# Patient Record
Sex: MALE | Race: WHITE | NOT HISPANIC OR LATINO | Employment: UNEMPLOYED | URBAN - METROPOLITAN AREA
[De-identification: names, ages, dates, MRNs, and addresses within clinical notes are randomized per-mention and may not be internally consistent; named-entity substitution may affect disease eponyms.]

---

## 2019-05-08 ENCOUNTER — OFFICE VISIT (OUTPATIENT)
Dept: NEUROSURGERY | Facility: CLINIC | Age: 54
End: 2019-05-08

## 2019-05-08 ENCOUNTER — DOCUMENTATION (OUTPATIENT)
Dept: NEUROSURGERY | Facility: CLINIC | Age: 54
End: 2019-05-08

## 2019-05-08 VITALS
SYSTOLIC BLOOD PRESSURE: 155 MMHG | WEIGHT: 226.8 LBS | TEMPERATURE: 97.4 F | HEIGHT: 78 IN | DIASTOLIC BLOOD PRESSURE: 100 MMHG | BODY MASS INDEX: 26.24 KG/M2

## 2019-05-08 DIAGNOSIS — M79.2 NEUROPATHIC PAIN: Primary | ICD-10-CM

## 2019-05-08 DIAGNOSIS — E13.649 UNCONTROLLED OTHER SPECIFIED DIABETES MELLITUS WITH HYPOGLYCEMIA WITHOUT COMA (HCC): ICD-10-CM

## 2019-05-08 PROCEDURE — 99244 OFF/OP CNSLTJ NEW/EST MOD 40: CPT | Performed by: NEUROLOGICAL SURGERY

## 2019-05-08 RX ORDER — ATORVASTATIN CALCIUM 20 MG/1
TABLET, FILM COATED ORAL
COMMUNITY
Start: 2019-05-06

## 2019-05-08 RX ORDER — GABAPENTIN 300 MG/1
300 CAPSULE ORAL 3 TIMES DAILY
Qty: 90 CAPSULE | Refills: 5 | OUTPATIENT
Start: 2019-05-08 | End: 2019-10-22

## 2019-05-08 RX ORDER — GABAPENTIN 100 MG/1
CAPSULE ORAL
COMMUNITY
Start: 2019-05-06 | End: 2019-05-08

## 2019-05-08 NOTE — PROGRESS NOTES
Subjective     Chief Complaint: Left-sided abdominal wall pain    Patient ID: Ravin Richey is a 53 y.o. male seen for consultation today at the request of  RACHEL Saul    Back Pain   This is a chronic problem. The current episode started more than 1 year ago. The problem occurs intermittently. The problem is unchanged. The pain is present in the lumbar spine. The quality of the pain is described as aching. Associated symptoms include numbness and weakness. Pertinent negatives include no abdominal pain, chest pain, dysuria, fever or headaches.   Abdominal Pain   This is a new problem. The current episode started more than 1 month ago. The onset quality is sudden. The problem occurs constantly. The most recent episode lasted 8 weeks. The problem has been unchanged. The pain is located in the LLQ and LUQ. The pain is at a severity of 10/10. The pain is severe. The quality of the pain is burning (Numb). The abdominal pain radiates to the RUQ. Associated symptoms include arthralgias. Pertinent negatives include no constipation, diarrhea, dysuria, fever, frequency, headaches, hematuria, myalgias, nausea or vomiting. Exacerbated by: Skin contact. The pain is relieved by nothing. Treatments tried: Low-dose Neurontin. The treatment provided no relief. Prior workup: MRI lumbar and thoracic, KUB.       This is a 53-year-old man who presents to my clinic with chief complaints of left-sided upper and lower quadrant abdominal pain.  He describes the pain is simultaneously being numb and intensely burning.  He denies antecedent trauma.  He has had long-standing history of chronic low back pain and intermittent sciatica.    His medical comorbidities include tobacco abuse, and poorly controlled diabetes.  He had previously been on insulin, but lost access to a healthcare provider and has not been having any sort of diabetes treatment for the last 2 years.  He was recently started on metformin and an anticholesterol  agent.  He was also started on low-dose gabapentin for his abdominal wall discomfort.  He was initially thought to have some sort of impaction syndrome, however he has had multiple enemas and it does not appear to be constipated clinically.    The following portions of the patient's history were reviewed and updated as appropriate: allergies, current medications, past family history, past medical history, past social history, past surgical history and problem list.    Family history:   Family History   Problem Relation Age of Onset   • Hypertension Mother    • Heart disease Father    • Lupus Sister        Social history:   Social History     Socioeconomic History   • Marital status: Single     Spouse name: Not on file   • Number of children: Not on file   • Years of education: Not on file   • Highest education level: Not on file   Tobacco Use   • Smoking status: Current Every Day Smoker     Types: Cigars   • Smokeless tobacco: Never Used   Substance and Sexual Activity   • Alcohol use: Yes   • Drug use: No   • Sexual activity: Defer       Review of Systems   Constitutional: Positive for activity change, fatigue and unexpected weight change. Negative for appetite change, chills, diaphoresis and fever.   HENT: Positive for tinnitus. Negative for congestion, dental problem, drooling, ear discharge, ear pain, facial swelling, hearing loss, mouth sores, nosebleeds, postnasal drip, rhinorrhea, sinus pressure, sneezing, sore throat, trouble swallowing and voice change.    Eyes: Negative for photophobia, pain, discharge, redness, itching and visual disturbance.   Respiratory: Negative for apnea, cough, choking, chest tightness, shortness of breath, wheezing and stridor.    Cardiovascular: Negative for chest pain, palpitations and leg swelling.   Gastrointestinal: Negative for abdominal distention, abdominal pain, anal bleeding, blood in stool, constipation, diarrhea, nausea, rectal pain and vomiting.   Endocrine: Negative  "for cold intolerance, heat intolerance, polydipsia, polyphagia and polyuria.   Genitourinary: Positive for flank pain. Negative for decreased urine volume, difficulty urinating, dysuria, enuresis, frequency, genital sores, hematuria and urgency.   Musculoskeletal: Positive for arthralgias and back pain. Negative for gait problem, joint swelling, myalgias, neck pain and neck stiffness.   Skin: Negative for color change, pallor, rash and wound.   Allergic/Immunologic: Negative for environmental allergies, food allergies and immunocompromised state.   Neurological: Positive for weakness and numbness. Negative for dizziness, tremors, seizures, syncope, facial asymmetry, speech difficulty, light-headedness and headaches.   Hematological: Negative for adenopathy. Does not bruise/bleed easily.   Psychiatric/Behavioral: Positive for agitation and sleep disturbance. Negative for behavioral problems, confusion, decreased concentration, dysphoric mood, hallucinations, self-injury and suicidal ideas. The patient is not nervous/anxious and is not hyperactive.        Objective   Blood pressure 155/100, temperature 97.4 °F (36.3 °C), temperature source Temporal, height 200.7 cm (79\"), weight 103 kg (226 lb 12.8 oz).  Body mass index is 25.55 kg/m².    Physical Exam   Constitutional: He is oriented to person, place, and time. He appears well-developed.  Non-toxic appearance.   HENT:   Head: Normocephalic and atraumatic.   Right Ear: Hearing normal.   Left Ear: Hearing normal.   Nose: Nose normal.   Eyes: Conjunctivae, EOM and lids are normal. Pupils are equal, round, and reactive to light.   Neck: Normal range of motion. No JVD present.   Cardiovascular: Normal rate and regular rhythm.   Pulses:       Radial pulses are 2+ on the right side, and 2+ on the left side.   Pulmonary/Chest: Effort normal. No stridor. No respiratory distress. He has no wheezes.   Abdominal:       Musculoskeletal:        Right hip: He exhibits decreased " range of motion.        Left hip: He exhibits decreased range of motion and tenderness.        Thoracic back: He exhibits no tenderness, no swelling, no pain and no spasm.        Lumbar back: He exhibits decreased range of motion and tenderness. He exhibits no bony tenderness, no swelling, no pain and no spasm.   Muscle Group    L          R  Hip Flexor          5          5  Knee Extensor  5          5  ADF                   5          5  APF                   5          5  EHL                   5          5   Neurological: He is alert and oriented to person, place, and time. He has normal strength and normal reflexes. He displays normal reflexes. No cranial nerve deficit or sensory deficit. He exhibits normal muscle tone. He displays a negative Romberg sign. GCS eye subscore is 4. GCS verbal subscore is 5. GCS motor subscore is 6.   Reflex Scores:       Tricep reflexes are 2+ on the right side and 2+ on the left side.       Bicep reflexes are 2+ on the right side and 2+ on the left side.       Brachioradialis reflexes are 2+ on the right side and 2+ on the left side.       Patellar reflexes are 2+ on the right side and 2+ on the left side.       Achilles reflexes are 2+ on the right side and 2+ on the left side.  Skin: Skin is warm and dry. No rash noted. No erythema.   Psychiatric: He has a normal mood and affect. His behavior is normal. Judgment and thought content normal.   Nursing note and vitals reviewed.        Assessment/Plan     Independent Review of Radiographic Studies:      Available for my review is a MRI of the thoracic and lumbar spine.  The thoracic MRI is completely unremarkable.  There is no evidence of disc herniation or extruded fragment which might be contributing to a thoracic radiculopathy.  His lumbar spine demonstrates a scoliotic deformity with severe degenerative disc disease, most notably at L4-5 and L5-S1.  L5-S1 does not demonstrate any significant neuroforaminal stenosis, however  there is bilateral lateral recess stenosis which is slightly worse on the left side as compared to the right, and could be reasonably expected to contributing to a left-sided S1 radiculopathy.  There is a grade 1 spondylolisthesis of L5 on S1.  There is moderate lateral recess stenosis at L4-5 which may be contributing to a right-sided L5 radiculopathy.  There are no appreciable disc herniations which could be contributing to his left-sided chest wall pain.  There is a far lateral disc at L1-2 which is eccentric to the right side and may be compressing the exiting L1 nerve root.    Medical Decision Making:      This is a 53-year-old man with poorly controlled diabetes, tobacco abuse, and a approximately 8-week history of neuropathic pain of his left abdominal wall.    I would like to perform an EMG/nerve conduction test to assess for any sort of neuropathy.  I suspect that he probably has a cutaneous nerve entrapment syndrome which should be amenable to physical therapy.    I have also increased his dose of Neurontin.  He does not have any external sequelae to support a diagnosis of zoster at this point.    I do not think is a candidate for neurosurgical intervention.  I will call him with the results of his EMG/nerve conduction test and make the appropriate referral to either neurology or physical therapy.    Ravin was seen today for back pain and abdominal pain.    Diagnoses and all orders for this visit:    Neuropathic pain  -     EMG & Nerve Conduction Test  -     gabapentin (NEURONTIN) 300 MG capsule; Take 1 capsule by mouth 3 (Three) Times a Day.    Uncontrolled other specified diabetes mellitus with hypoglycemia without coma (CMS/HCC)  -     EMG & Nerve Conduction Test  -     gabapentin (NEURONTIN) 300 MG capsule; Take 1 capsule by mouth 3 (Three) Times a Day.        Return if symptoms worsen or fail to improve.           This document signed by BENNY Arreola MD May 8, 2019 2:07 PM

## 2019-05-09 ENCOUNTER — HOSPITAL ENCOUNTER (OUTPATIENT)
Dept: NEUROLOGY | Facility: HOSPITAL | Age: 54
Discharge: HOME | End: 2019-05-09
Admitting: NEUROLOGICAL SURGERY

## 2019-05-09 PROCEDURE — 95913 NRV CNDJ TEST 13/> STUDIES: CPT

## 2019-05-09 PROCEDURE — 95886 MUSC TEST DONE W/N TEST COMP: CPT

## 2019-10-22 ENCOUNTER — OFFICE VISIT (OUTPATIENT)
Dept: NEUROLOGY | Facility: CLINIC | Age: 54
End: 2019-10-22

## 2019-10-22 VITALS
HEIGHT: 78 IN | BODY MASS INDEX: 26.15 KG/M2 | SYSTOLIC BLOOD PRESSURE: 158 MMHG | DIASTOLIC BLOOD PRESSURE: 78 MMHG | WEIGHT: 226 LBS

## 2019-10-22 DIAGNOSIS — M54.14 THORACIC RADICULOPATHY DUE TO DIABETES MELLITUS (HCC): Primary | ICD-10-CM

## 2019-10-22 DIAGNOSIS — E11.69 THORACIC RADICULOPATHY DUE TO DIABETES MELLITUS (HCC): Primary | ICD-10-CM

## 2019-10-22 PROCEDURE — 99204 OFFICE O/P NEW MOD 45 MIN: CPT | Performed by: PSYCHIATRY & NEUROLOGY

## 2019-10-22 RX ORDER — GABAPENTIN 300 MG/1
300 CAPSULE ORAL 3 TIMES DAILY
Qty: 90 CAPSULE | Refills: 1 | Status: SHIPPED | OUTPATIENT
Start: 2019-10-22 | End: 2019-11-18

## 2019-10-22 RX ORDER — AMITRIPTYLINE HYDROCHLORIDE 25 MG/1
50 TABLET, FILM COATED ORAL NIGHTLY
Qty: 60 TABLET | Refills: 0 | Status: SHIPPED | OUTPATIENT
Start: 2019-10-22 | End: 2019-11-18 | Stop reason: SINTOL

## 2019-10-22 RX ORDER — GABAPENTIN 600 MG/1
600 TABLET ORAL 3 TIMES DAILY
Qty: 90 TABLET | Refills: 1 | Status: SHIPPED | OUTPATIENT
Start: 2019-10-22 | End: 2019-11-18

## 2019-10-22 NOTE — PROGRESS NOTES
Subjective:    CC: Ravin Richey is seen today in consultation at the request of RACHEL Saul for Numbness       HPI:  Patient is a 54-year-old male with past medical history of uncontrolled type 2 diabetes referred to the clinic for evaluation of tingling, numbness, burning.  He reports that he was doing fine until March 1, 2019 when he suddenly started having tingling, numbness, burning and severe pain below the bellybutton to the left and then a few days later, it radiated to involve entire left middle and upper quadrant of the abdomen extending below the nipple and sometimes above the nipple on the left.  Eventually this radiated to the right upper and middle abdominal quadrant going down.  Essentially he has developed rectangular area of severe pain, numbness, burning and sensitivity to touch in T4-T10 dermatome.  He was started on gabapentin 600 mg 3 times daily and reports that it has helped with the pain but it has not helped with numbness, burning and sensitivity to touch.  He denies any side effects with gabapentin use.  He has had MRI of the thoracic spine which did not reveal any structural abnormalities.  He denies any neck pain or midthoracic pain.  He does report chronic low back pain and MRI of lumbosacral spine done showed moderate to severe spondylitic changes at L5-S1 levels.  He has had poor control of his diabetes and HbA1c few months ago was 13.2.  Last HbA1c was 8.2.  He is working hard and making sure he takes his medications regularly and taking good care of diet to bring blood sugar levels under good control.  Because of his ongoing symptoms, he is unable to work.    The following portions of the patient's history were reviewed today and updated as of 10/22/2019  : allergies, social history and problem list.  This document will be scanned to patient's chart.      Current Outpatient Medications:   •  metFORMIN (GLUCOPHAGE) 500 MG tablet, , Disp: , Rfl:   •  amitriptyline  "(ELAVIL) 25 MG tablet, Take 2 tablets by mouth Every Night., Disp: 60 tablet, Rfl: 0  •  atorvastatin (LIPITOR) 20 MG tablet, , Disp: , Rfl:   •  gabapentin (NEURONTIN) 300 MG capsule, Take 1 capsule by mouth 3 (Three) Times a Day., Disp: 90 capsule, Rfl: 1  •  gabapentin (NEURONTIN) 600 MG tablet, Take 1 tablet by mouth 3 (Three) Times a Day., Disp: 90 tablet, Rfl: 1   Past Medical History:   Diagnosis Date   • Diabetes mellitus (CMS/HCC)    • Low back pain       Past Surgical History:   Procedure Laterality Date   • ROTATOR CUFF REPAIR        Family History   Problem Relation Age of Onset   • Hypertension Mother    • Heart disease Father    • Lupus Sister       Review of Systems    All other systems reviewed and are negative     Objective:    /78   Ht 200.7 cm (79\")   Wt 103 kg (226 lb)   BMI 25.46 kg/m²     Neurology Exam:    General apperance: NAD.     Mental status: Alert, awake and oriented to time place and person.    Recent and Remote memory: Can recall 3/3 objects at 5 minutes. Can recall historical events.     Attention span and Concentration: Serial 7s: Normal.     Fund of knowledge:  Normal.     Language and Speech: No aphasia or dysarthria.    Naming , Repitition and Comprehension:  Can name objects, repeat a sentence and follow commands. Speech is clear and fluent with good repetition, comprehension, and naming.    Cranial Nerves:   CN II: Visual fields are full. Intact. Fundi - Normal, No papillederma, Pupils - CHARMAINE  CN III, IV and VI: Extraocular movements are intact. Normal saccades.   CN V: Facial sensation is intact.   CN VII: Muscles of facial expression reveal no asymmetry. Intact.   CN VIII: Hearing is intact. Whispered voice intact.   CN IX and X: Palate elevates symmetrically. Intact  CN XI: Shoulder shrug is intact.   CN XII: Tongue is midline without evidence of atrophy or fasciculation.     Motor:  Right UE muscle strength 5/5. Normal tone.     Left UE muscle strength 5/5. Normal " tone.      Right LE muscle strength5/5. Normal tone.     Left LE muscle strength 5/5. Normal tone.      Sensory: Sensitivity to touch in a rectangular area involving the abdominal wall.    DTRs: 2+ bilaterally in upper and lower extremities.    Babinski: Negative bilaterally.    Co-ordination: Normal finger-to-nose, heel to shin B/L.    Rhomberg: Negative.    Gait: Normal.    Cardiovascular: Regular rate and rhythm without murmur, gallop or rub.    Ophthalmoscopic exam: Normal fundi, no papilledema.    Assessment and Plan:  1. Thoracic radiculopathy due to diabetes mellitus  Patient with sudden onset of tingling, numbness, burning and pain involving T4-T10 dermatomal area in a rectangular since March 2019.  Diabetic thoracic radiculopathy in a patient with normal MRI of thoracic spine.  With gabapentin 600 mg 3 times daily dose, the pain is better but he continues to have significant sensitivity to touch, burning and numbness.  Since he does not have any side effects with gabapentin, it will be increased to 900 mg 3 times daily dose in next 2 to 3 weeks.  Will also add amitriptyline 25 mg at bedtime to take relief.  I have advised him to call office with response in 2 weeks and at that time based on the response, further dose adjustments will be made to his medications.  I will see him back in 4 to 6 weeks for follow-up.    No Follow-up on file.     Aryan Seymour MD

## 2019-11-07 ENCOUNTER — TELEPHONE (OUTPATIENT)
Dept: NEUROLOGY | Facility: CLINIC | Age: 54
End: 2019-11-07

## 2019-11-07 NOTE — TELEPHONE ENCOUNTER
----- Message from Kervin Hernandez sent at 11/7/2019  1:40 PM EST -----  Contact: [phone]  Dr. Seymour,    Pt called in regards to an update on his Rx gabapentin and amitriptyline (ELAVIL) 25 MG tablet. Pt states the Gabapentin is doing good so far as stopping the pain for a while, but pain does come back. Pt states the Amitriptyline helps him sleep, but has side effects that he does not like. Pt states he is having trouble urinating, could not breathe last night after his right side started hurting, it makes his eyes hurt, he's dehydrated and he just feels a total mess. Pt states when he takes a deep breath he feels pressure on his lungs. Please advise.

## 2019-11-07 NOTE — TELEPHONE ENCOUNTER
Tell him to stop amitriptyline.  Does he have any side effects with gabapentin 900 mg 3 times a day dose?  If not then it can be increased further.  Thanks.

## 2019-11-08 NOTE — TELEPHONE ENCOUNTER
Relayed information patient confirmed understanding. He said he would give the increase a try and call back in 7-10 days.

## 2019-11-18 ENCOUNTER — OFFICE VISIT (OUTPATIENT)
Dept: NEUROLOGY | Facility: CLINIC | Age: 54
End: 2019-11-18

## 2019-11-18 VITALS
HEIGHT: 78 IN | DIASTOLIC BLOOD PRESSURE: 60 MMHG | BODY MASS INDEX: 26.15 KG/M2 | SYSTOLIC BLOOD PRESSURE: 124 MMHG | WEIGHT: 226 LBS

## 2019-11-18 DIAGNOSIS — E11.69 THORACIC RADICULOPATHY DUE TO DIABETES MELLITUS (HCC): Primary | ICD-10-CM

## 2019-11-18 DIAGNOSIS — M54.14 THORACIC RADICULOPATHY DUE TO DIABETES MELLITUS (HCC): Primary | ICD-10-CM

## 2019-11-18 PROCEDURE — 99214 OFFICE O/P EST MOD 30 MIN: CPT | Performed by: PSYCHIATRY & NEUROLOGY

## 2019-11-18 RX ORDER — NORTRIPTYLINE HYDROCHLORIDE 50 MG/1
50 CAPSULE ORAL NIGHTLY
Qty: 30 CAPSULE | Refills: 1 | Status: SHIPPED | OUTPATIENT
Start: 2019-11-18

## 2019-11-18 NOTE — PROGRESS NOTES
Subjective:    CC: Ravin Richey is in clinic today for follow up for thoracic radiculopathy.    HPI:  He is in clinic for regular follow-up.  Since the last visit, he reports that he has been taking gabapentin 900 mg tablet, 3 tablets in the morning as when he took it 3 times a day, it was not as effective.  He reports that with 2700 mg total daily dose of gabapentin, symptoms are little improved but he still of pain, sensitivity to touch etc.  He reports that he has been taking amitriptyline 50 mg at bedtime and it has helped significantly with sleep but he has developed significant side effects including urinary retention, extreme dryness of eyes and mouth.  He wants to come off of amitriptyline.    The following portions of the patient's history were reviewed and updated as of 11/18/2019: allergies, social history and problem list.       Current Outpatient Medications:   •  atorvastatin (LIPITOR) 20 MG tablet, , Disp: , Rfl:   •  metFORMIN (GLUCOPHAGE) 500 MG tablet, , Disp: , Rfl:   •  gabapentin, once-daily, (GRALISE) 600 MG tablet tablet, Take 4 in the morning., Disp: 120 tablet, Rfl: 1  •  nortriptyline (PAMELOR) 50 MG capsule, Take 1 capsule by mouth Every Night., Disp: 30 capsule, Rfl: 1   Past Medical History:   Diagnosis Date   • Diabetes mellitus (CMS/HCC)    • Low back pain       Past Surgical History:   Procedure Laterality Date   • ROTATOR CUFF REPAIR        Family History   Problem Relation Age of Onset   • Hypertension Mother    • Heart disease Father    • Lupus Sister         Review of Systems   Constitutional: Positive for appetite change and fatigue.   Eyes: Positive for pain.   Respiratory: Positive for chest tightness and wheezing.    Cardiovascular: Positive for chest pain.   Gastrointestinal: Positive for abdominal pain.   Genitourinary: Positive for difficulty urinating.   Musculoskeletal: Positive for back pain and gait problem.   Neurological: Positive for dizziness, light-headedness  "and numbness.   Psychiatric/Behavioral: Positive for sleep disturbance.     Objective:    /60   Ht 200.7 cm (79\")   Wt 103 kg (226 lb)   BMI 25.46 kg/m²     Neurology Exam:  General apperance: NAD.     Mental status: Alert, awake and oriented to time place and person.    Recent and Remote memory: Can recall 3/3 objects at 5 minutes. Can recall historical events.     Attention span and Concentration: Serial 7s: Normal.     Fund of knowledge:  Normal.     Language and Speech: No aphasia or dysarthria.    Naming , Repitition and Comprehension:  Can name objects, repeat a sentence and follow commands. Speech is clear and fluent with good repetition, comprehension, and naming.    CN II to XII: Intact.    Opthalmoscopic Exam: No papilledema.    Motor:  Right UE muscle strength 5/5. Normal tone.     Left UE muscle strength 5/5. Normal tone.      Right LE muscle strength5/5. Normal tone.     Left LE muscle strength 5/5. Normal tone.      Sensory: Normal light touch, vibration and pinprick sensation bilaterally.    DTRs: 2+ bilaterally.    Babinski: Negative bilaterally.    Co-ordination: Normal finger-to-nose, heel to shin B/L.    Rhomberg: Negative.    Gait: Normal.    Cardiovascular: Regular rate and rhythm without murmur, gallop or rub.    Assessment and Plan:  1. Thoracic radiculopathy due to diabetes mellitus  He has had mild response with high-dose gabapentin 2700 mg daily.  Will change it to Gralise 600 mg tablets, 3 tablets daily with supper for better therapeutic effect.  Since he has developed a side effect to amitriptyline, it will be changed to nortriptyline 50 mg at bedtime.  I would advise him to call office with response in 2 weeks.  If no response with this combination then different occasion can be tried.  I also discussed about possibility of spinal cord stimulator as he is in severe pain and it has affected his quality of life significantly.       I spent 25 minutes face to face with the patient " and spent more than 50% of this time  in management, instructions and education regarding above mentioned diagnosis and also on counseling and discussing about taking medication regularly, possible side effects with medication use, importance of good sleep hygiene, good hydration and regular exercise.    Return in about 6 weeks (around 12/30/2019).

## 2019-11-19 ENCOUNTER — TELEPHONE (OUTPATIENT)
Dept: NEUROLOGY | Facility: CLINIC | Age: 54
End: 2019-11-19

## 2019-11-19 NOTE — TELEPHONE ENCOUNTER
called and informed pt sister, Lauren, that pt only needs to take 3 Gralise and it needs to be taken with dinner each night. Lauren acknowledged understanding.

## 2019-11-19 NOTE — TELEPHONE ENCOUNTER
----- Message from Aryan Seymour MD sent at 11/18/2019  2:52 PM EST -----  Regarding: Gralise Dose   Can you call patient and let him know that initially I want him to take Gralise 600 mg tablets, 3 tablets with supper/dinner.  Do not take 4 tablets for now.  It has to be taken with supper/dinner for it to be more effective.  Do not take it during daytime.    Thanks.

## 2019-12-26 ENCOUNTER — TELEPHONE (OUTPATIENT)
Dept: NEUROLOGY | Facility: CLINIC | Age: 54
End: 2019-12-26

## 2019-12-26 NOTE — TELEPHONE ENCOUNTER
"Pt's sister called and stated that she would like to cancel her brother appt.  When asked if she would like to reschedule his appt she stated \"no I will not he  2 days after Dr. Seymour told him nothing was wrong with him\" and disconnected the call.   "